# Patient Record
Sex: MALE | Race: WHITE | NOT HISPANIC OR LATINO
[De-identification: names, ages, dates, MRNs, and addresses within clinical notes are randomized per-mention and may not be internally consistent; named-entity substitution may affect disease eponyms.]

---

## 2022-11-21 ENCOUNTER — APPOINTMENT (OUTPATIENT)
Dept: ORTHOPEDIC SURGERY | Facility: CLINIC | Age: 72
End: 2022-11-21

## 2022-11-21 ENCOUNTER — NON-APPOINTMENT (OUTPATIENT)
Age: 72
End: 2022-11-21

## 2022-11-21 PROCEDURE — 99204 OFFICE O/P NEW MOD 45 MIN: CPT

## 2022-11-21 PROCEDURE — 72082 X-RAY EXAM ENTIRE SPI 2/3 VW: CPT

## 2022-11-21 NOTE — PHYSICAL EXAM
[de-identified] : Lumbar Physical Exam\par \par The patient walks with antalgic gait, he is clearly dealing with significant back stiffness as he is not showing any signs of lumbar range of motion or motion in his back with ambulation\par \par Reflexes\par Patellar - normal\par Gastroc - normal\par Clonus - No\par \par Hip Exam - Normal\par \par Straight leg raise - none\par \par Pulses - 2+ dp/pt\par \par Range of motion -globally reduced\par \par Sensation \par Sensation intact to light touch in L1, L2, L3, L4, L5 and S1 dermatomes bilaterally\par \par Motor\par 	IP	Quad	HS	TA	Gastroc	EHL\par Right	5/5	5/5	5/5	5/5	5/5	5/5\par Left	5/5	5/5	5/5	5/5	5/5	5/5 [de-identified] : Scoliosis radiographs\par Disc degeneration noted\par Significant T11-T12, T10-T11 endplate changes\par SVA within normal limits\par \par CT scan reviewed from \par T11 compression fracture noted\par Disc related changes which are concerning for osteomyelitis discitis

## 2022-11-21 NOTE — ASSESSMENT
[FreeTextEntry1] : I had a long discussion with the patient in regards to his treatment plan and diagnosis.  He is getting IV antibiotics for bacteremia.  I do think that we should fully evaluate his thoracic compression fracture with an MRI given his history of bacteremia.  I want to ensure he is not dealing with any sort of discitis osteomyelitis type picture.  He was told he did not have this when this was completely worked up with a 10-day hospital admission at [facility].  In any case we will proceed with a thoracic MRI.  I would also like him to walk is much as possible.  I will have him follow-up in 3 weeks for repeat clinical evaluation.  I encouraged to reach out sooner if he has any new or worsening symptoms.

## 2022-11-21 NOTE — HISTORY OF PRESENT ILLNESS
[de-identified] : This is a 72-year-old male that is here today for evaluation of his mid back pain.  The back pain started approximately 1 month ago.  He was eventually treated at [facility].  He was found to have a T11 compression fracture.  During the work-up he was also found to be bacteremic.  He was treated with IV antibiotics, cephalexin.  He is currently still being treated with his IV antibiotics.  He currently denies any radicular type pain.  He still has mid back pain.  He does feel like it is slowly improving.  He has been diligently wearing his brace as well which she does state helps some.

## 2022-11-22 ENCOUNTER — NON-APPOINTMENT (OUTPATIENT)
Age: 72
End: 2022-11-22

## 2022-11-22 ENCOUNTER — APPOINTMENT (OUTPATIENT)
Dept: MRI IMAGING | Facility: CLINIC | Age: 72
End: 2022-11-22

## 2022-11-22 ENCOUNTER — OUTPATIENT (OUTPATIENT)
Dept: OUTPATIENT SERVICES | Facility: HOSPITAL | Age: 72
LOS: 1 days | End: 2022-11-22

## 2022-11-22 DIAGNOSIS — M54.9 DORSALGIA, UNSPECIFIED: ICD-10-CM

## 2022-11-22 PROCEDURE — 72146 MRI CHEST SPINE W/O DYE: CPT | Mod: 26

## 2022-11-23 ENCOUNTER — NON-APPOINTMENT (OUTPATIENT)
Age: 72
End: 2022-11-23

## 2022-12-06 ENCOUNTER — TRANSCRIPTION ENCOUNTER (OUTPATIENT)
Age: 72
End: 2022-12-06

## 2022-12-21 ENCOUNTER — APPOINTMENT (OUTPATIENT)
Dept: ORTHOPEDIC SURGERY | Facility: CLINIC | Age: 72
End: 2022-12-21

## 2022-12-21 DIAGNOSIS — M54.9 DORSALGIA, UNSPECIFIED: ICD-10-CM

## 2022-12-21 PROCEDURE — 99214 OFFICE O/P EST MOD 30 MIN: CPT

## 2022-12-21 RX ORDER — TIZANIDINE 2 MG/1
2 TABLET ORAL
Qty: 30 | Refills: 0 | Status: ACTIVE | COMMUNITY
Start: 2022-12-21 | End: 1900-01-01

## 2022-12-21 NOTE — PHYSICAL EXAM
[de-identified] : Lumbar Physical Exam\par \par Gait - Normal\par \par Station - Normal\par \par Sagittal balance - Normal\par \par Compensatory mechanism? - None\par \par Heel walk - Normal\par \par Toe walk - Normal\par \par Reflexes\par Patellar - normal\par Gastroc - normal\par Clonus - No\par \par Hip Exam - Normal\par \par Straight leg raise - none\par \par Pulses - 2+ dp/pt\par \par Range of motion - normal\par \par Sensation \par Sensation intact to light touch in L1, L2, L3, L4, L5 and S1 dermatomes bilaterally\par \par Motor\par 	IP	Quad	HS	TA	Gastroc	EHL\par Right	5/5	5/5	5/5	5/5	5/5	5/5\par Left	5/5	5/5	5/5	5/5	5/5	5/5 [de-identified] : Scoliosis radiographs\par Disc degeneration noted\par Significant T11-T12, T10-T11 endplate changes\par SVA within normal limits\par \par CT scan reviewed from \par T11 compression fracture noted\par Disc related changes which are concerning for osteomyelitis discitis

## 2022-12-21 NOTE — HISTORY OF PRESENT ILLNESS
[de-identified] : Today the patient states that overall he is doing better.  He feels like his back pain has improved ever since being treated with antibiotics.  His blood cultures are negative which is a great sign.  He denies any bowel bladder issues.  Denies any saddle anesthesia.  Overall he is pleased with his recovery to date.\par \par 11/22/22\par This is a 72-year-old male that is here today for evaluation of his mid back pain.  The back pain started approximately 1 month ago.  He was eventually treated at [facility].  He was found to have a T11 compression fracture.  During the work-up he was also found to be bacteremic.  He was treated with IV antibiotics, cephalexin.  He is currently still being treated with his IV antibiotics.  He currently denies any radicular type pain.  He still has mid back pain.  He does feel like it is slowly improving.  He has been diligently wearing his brace as well which she does state helps some.

## 2024-05-09 ENCOUNTER — APPOINTMENT (OUTPATIENT)
Dept: ORTHOPEDIC SURGERY | Facility: CLINIC | Age: 74
End: 2024-05-09
Payer: MEDICARE

## 2024-05-09 VITALS
WEIGHT: 230 LBS | SYSTOLIC BLOOD PRESSURE: 120 MMHG | DIASTOLIC BLOOD PRESSURE: 71 MMHG | BODY MASS INDEX: 31.15 KG/M2 | OXYGEN SATURATION: 96 % | HEART RATE: 72 BPM | HEIGHT: 72 IN

## 2024-05-09 PROCEDURE — 73030 X-RAY EXAM OF SHOULDER: CPT | Mod: RT

## 2024-05-09 PROCEDURE — 20611 DRAIN/INJ JOINT/BURSA W/US: CPT | Mod: RT

## 2024-05-15 ENCOUNTER — APPOINTMENT (OUTPATIENT)
Dept: ORTHOPEDIC SURGERY | Facility: CLINIC | Age: 74
End: 2024-05-15
Payer: MEDICARE

## 2024-05-15 VITALS
DIASTOLIC BLOOD PRESSURE: 75 MMHG | BODY MASS INDEX: 31.15 KG/M2 | WEIGHT: 230 LBS | HEART RATE: 69 BPM | SYSTOLIC BLOOD PRESSURE: 118 MMHG | HEIGHT: 72 IN | OXYGEN SATURATION: 93 %

## 2024-05-15 DIAGNOSIS — M25.512 PAIN IN RIGHT SHOULDER: ICD-10-CM

## 2024-05-15 DIAGNOSIS — M25.511 PAIN IN RIGHT SHOULDER: ICD-10-CM

## 2024-05-15 DIAGNOSIS — M19.019 PRIMARY OSTEOARTHRITIS, UNSPECIFIED SHOULDER: ICD-10-CM

## 2024-05-15 PROCEDURE — 20611 DRAIN/INJ JOINT/BURSA W/US: CPT | Mod: LT

## 2024-05-20 PROBLEM — M25.511 BILATERAL SHOULDER PAIN, UNSPECIFIED CHRONICITY: Status: ACTIVE | Noted: 2024-03-13

## 2024-05-20 PROBLEM — M19.019 GLENOHUMERAL ARTHRITIS: Status: ACTIVE | Noted: 2024-05-09

## 2024-05-23 NOTE — PHYSICAL EXAM
[de-identified] : Constitutional: Well-nourished, well-developed, No acute distress Respiratory: Good respiratory effort, no SOB Lymphatic: No regional lymphadenopathy, no lymphedema Psychiatric: Pleasant and normal affect, alert and oriented x3 Skin: Clean dry and intact B/L UE/LE Musculoskeletal: normal except where as noted in regional exam  BILATERAL Shoulders: APPEARANCE: no marked deformities, no swelling or malalignment POSITIVE TENDERNESS: supraspinatus, infraspinatus, teres minor, LH biceps, anterior and posterior capsule, NONTENDER: AC joint ROM: limited in all directions due to pain SPECIAL TESTS: +Drop Arm, + Empty Can, +Solomon/Neers, neg Persaud's, neg Speeds, neg Apprehension, neg cross arm adduction, neg apley's scratch test Vasc: 2+ radial pulse Neuro: AIN, PIN, Ulnar nerve intact to motor, DTRs 2+/4 biceps, triceps, brachioradialis Sensation: Intact to light touch throughout B/L Elbows: No asymmetry, malalignment, or swelling, Full ROM, 5/5 strength in flexion/ext, pronation/supination, Joints stable B/L Wrist and Hand: No asymmetry, malalignment, or swelling, Full ROM, 5/5 strength in wrist and long finger flexion/ext, radial/ulnar deviation, Joints stable

## 2024-05-23 NOTE — HISTORY OF PRESENT ILLNESS
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  Pain is primarily located at the posterior shoulders. Patient reports many years of chronic shoulder pain. Notes he has rotator cuff tears and osteoarthritis bilateral shoulders. States his pain was exacerbated over 1.5 months ago by a fall onto his right side.   Reports mild relief with Extra Strength Tylenol, no relief with physical therapy. MRIs done recently at [facility]. Patient was last seen 5/9/24 and had a right shoulder IA injection with great relief, was able to return to golf. Presents today for left shoulder injection.

## 2024-05-23 NOTE — DISCUSSION/SUMMARY
[de-identified] : Sahil presents for follow up with full thickness rotator cuff tears and end stage osteoarthritis of both shoulders. Ultrasound guided intraarticular LEFT shoulder injection performed today. Patient tolerated the procedure well. I informed the patient that the numbing medicine in today's injection will last for about 4-6 hours. The steroid that was injected will start to work in 1 to 2 days, peak at 1-2 weeks, and may last up to 4-6 weeks.  Pain may worsen over the next 72 hours. Patient was also informed they may notice local skin depigmentation, which is normal.   Follow up in 3 months.    Iman Galeana MD, [facility] Department of Orthopedics     IDee Dee, assisted with documentation on 05/16/2024  acting as scribe for Dr. Iman Galeana.   I, Iman Galeana MD, [facility], personally performed the services described in the documentation, reviewed the documentation recorded by the scribe in my presence and it accurately and completely records my words and actions.

## 2024-05-23 NOTE — ADDENDUM
[FreeTextEntry1] :  I, Iman Galeana MD, [facility], personally performed the services described in the documentation, reviewed the documentation recorded by the scribe in my presence and it accurately and completely records my words and actions.

## 2024-05-23 NOTE — PROCEDURE
[de-identified] : Ultrasound-Guided Diagnostic/Therapeutic Injection: LEFT  glenohumeral joint   Indication for U/S Guidance: Ensure placement within the glenohumeral joint for diagnostic purposes, while avoiding neurovascular structures  Indication for Injection: osteoarthritis   A discussion was had with the patient regarding this procedure and all questions were answered. All risks, benefits and alternatives were discussed. These included but were not limited to bleeding, infection, and allergic reaction.  A timeout was done to ensure correct side and pt agreed to the procedure.  Chlorhexidine was used to clean the skin in the anterior aspect of the shoulder joint. The glenohumeral joint was visualized utilizing the [employer], the Curvilinear 30cm 5-2 MHz transducer, sterile probe cover and sterile ultrasound gel.  The joint was visualized in the longitudinal axis and an in-plane approach was used for the injection.  Ultrasound guidance was utilized to ensure accuracy of the intra-articular injection, and avoid the femoral neurovascular structures.  A 25-gauge 1.5" needle was first used to inject 3cc of 1% lidocaine without epi into the subcutaneous tissue and muscle for local anesthesia.  Following that a 22-gauge 3" needle was used to inject 4cc of 1% lidocaine without epinephrine and 1cc of 40mg/ml methylprednisolone into the glenohumeral joint. An image confirming the correct location of the needle placement and infusion of the steroid at the end of the injection was saved.  A sterile bandage was then applied. The patient tolerated the procedure well and there were no complications.bandage was then applied. The patient tolerated the procedure well and there were no complications.

## 2024-12-09 ENCOUNTER — APPOINTMENT (OUTPATIENT)
Dept: ORTHOPEDIC SURGERY | Facility: CLINIC | Age: 74
End: 2024-12-09

## 2024-12-09 DIAGNOSIS — M19.019 PRIMARY OSTEOARTHRITIS, UNSPECIFIED SHOULDER: ICD-10-CM

## 2024-12-09 PROCEDURE — 20611 DRAIN/INJ JOINT/BURSA W/US: CPT | Mod: 1L,RT

## 2025-04-08 RX ORDER — HYALURONATE SODIUM, STABILIZED 88 MG/4 ML
88 SYRINGE (ML) INTRAARTICULAR
Qty: 1 | Refills: 0 | Status: ACTIVE | COMMUNITY
Start: 2025-04-08

## 2025-04-10 ENCOUNTER — APPOINTMENT (OUTPATIENT)
Dept: ORTHOPEDIC SURGERY | Facility: CLINIC | Age: 75
End: 2025-04-10

## 2025-04-10 DIAGNOSIS — M19.019 PRIMARY OSTEOARTHRITIS, UNSPECIFIED SHOULDER: ICD-10-CM

## 2025-04-10 PROCEDURE — 20611 DRAIN/INJ JOINT/BURSA W/US: CPT | Mod: 50
